# Patient Record
Sex: FEMALE | URBAN - METROPOLITAN AREA
[De-identification: names, ages, dates, MRNs, and addresses within clinical notes are randomized per-mention and may not be internally consistent; named-entity substitution may affect disease eponyms.]

---

## 2024-06-11 ENCOUNTER — HOSPITAL ENCOUNTER (OUTPATIENT)
Dept: RADIOLOGY | Facility: EXTERNAL LOCATION | Age: 54
Discharge: HOME | End: 2024-06-11

## 2024-06-18 ENCOUNTER — HOSPITAL ENCOUNTER (OUTPATIENT)
Dept: CARDIOLOGY | Facility: HOSPITAL | Age: 54
Discharge: HOME | End: 2024-06-18
Payer: COMMERCIAL

## 2024-06-18 ENCOUNTER — OFFICE VISIT (OUTPATIENT)
Dept: CARDIOLOGY | Facility: HOSPITAL | Age: 54
End: 2024-06-18
Payer: COMMERCIAL

## 2024-06-18 VITALS
DIASTOLIC BLOOD PRESSURE: 82 MMHG | SYSTOLIC BLOOD PRESSURE: 128 MMHG | HEIGHT: 70 IN | OXYGEN SATURATION: 98 % | BODY MASS INDEX: 32.93 KG/M2 | WEIGHT: 230 LBS | HEART RATE: 60 BPM

## 2024-06-18 DIAGNOSIS — E11.9 TYPE 2 DIABETES MELLITUS WITHOUT COMPLICATION, WITHOUT LONG-TERM CURRENT USE OF INSULIN (MULTI): ICD-10-CM

## 2024-06-18 DIAGNOSIS — I48.0 PAROXYSMAL ATRIAL FIBRILLATION (MULTI): Primary | ICD-10-CM

## 2024-06-18 DIAGNOSIS — I25.42 SPONTANEOUS DISSECTION OF CORONARY ARTERY: ICD-10-CM

## 2024-06-18 DIAGNOSIS — I21.19 ST ELEVATION MYOCARDIAL INFARCTION (STEMI) INVOLVING OTHER CORONARY ARTERY OF INFERIOR WALL (MULTI): ICD-10-CM

## 2024-06-18 DIAGNOSIS — I72.8 SPLENIC ARTERY ANEURYSM (CMS-HCC): ICD-10-CM

## 2024-06-18 PROBLEM — K57.92 DIVERTICULITIS: Status: ACTIVE | Noted: 2022-11-07

## 2024-06-18 PROBLEM — I10 HYPERTENSIVE DISORDER: Status: ACTIVE | Noted: 2022-11-07

## 2024-06-18 PROBLEM — E53.8 COBALAMIN DEFICIENCY: Status: ACTIVE | Noted: 2022-11-07

## 2024-06-18 PROBLEM — G43.909 MIGRAINE: Status: ACTIVE | Noted: 2022-11-07

## 2024-06-18 PROBLEM — E66.9 OBESITY: Status: ACTIVE | Noted: 2022-11-10

## 2024-06-18 PROBLEM — I48.91 ATRIAL FIBRILLATION (MULTI): Status: ACTIVE | Noted: 2022-11-07

## 2024-06-18 PROBLEM — I10 ESSENTIAL HYPERTENSION: Status: ACTIVE | Noted: 2022-12-29

## 2024-06-18 LAB
ATRIAL RATE: 60 BPM
P AXIS: 15 DEGREES
P OFFSET: 206 MS
P ONSET: 140 MS
PR INTERVAL: 172 MS
Q ONSET: 226 MS
QRS COUNT: 10 BEATS
QRS DURATION: 100 MS
QT INTERVAL: 464 MS
QTC CALCULATION(BAZETT): 464 MS
QTC FREDERICIA: 464 MS
R AXIS: -29 DEGREES
T AXIS: -56 DEGREES
T OFFSET: 458 MS
VENTRICULAR RATE: 60 BPM

## 2024-06-18 PROCEDURE — 93010 ELECTROCARDIOGRAM REPORT: CPT | Performed by: INTERNAL MEDICINE

## 2024-06-18 PROCEDURE — 3079F DIAST BP 80-89 MM HG: CPT | Performed by: INTERNAL MEDICINE

## 2024-06-18 PROCEDURE — 99215 OFFICE O/P EST HI 40 MIN: CPT | Performed by: INTERNAL MEDICINE

## 2024-06-18 PROCEDURE — 99205 OFFICE O/P NEW HI 60 MIN: CPT | Performed by: INTERNAL MEDICINE

## 2024-06-18 PROCEDURE — 1036F TOBACCO NON-USER: CPT | Performed by: INTERNAL MEDICINE

## 2024-06-18 PROCEDURE — 4010F ACE/ARB THERAPY RXD/TAKEN: CPT | Performed by: INTERNAL MEDICINE

## 2024-06-18 PROCEDURE — 93005 ELECTROCARDIOGRAM TRACING: CPT

## 2024-06-18 PROCEDURE — 3074F SYST BP LT 130 MM HG: CPT | Performed by: INTERNAL MEDICINE

## 2024-06-18 RX ORDER — PROPRANOLOL HYDROCHLORIDE 10 MG/1
10 TABLET ORAL
COMMUNITY
Start: 2023-02-28

## 2024-06-18 RX ORDER — ONDANSETRON 8 MG/1
TABLET, ORALLY DISINTEGRATING ORAL
COMMUNITY
Start: 2023-11-21

## 2024-06-18 RX ORDER — AMIODARONE HYDROCHLORIDE 200 MG/1
200 TABLET ORAL DAILY
COMMUNITY
Start: 2024-05-29

## 2024-06-18 RX ORDER — APIXABAN 5 MG/1
5 TABLET, FILM COATED ORAL 2 TIMES DAILY
COMMUNITY
Start: 2022-12-29

## 2024-06-18 RX ORDER — DRONEDARONE 400 MG/1
400 TABLET, FILM COATED ORAL AS NEEDED
COMMUNITY
Start: 2022-12-29

## 2024-06-18 RX ORDER — SEMAGLUTIDE 0.68 MG/ML
INJECTION, SOLUTION SUBCUTANEOUS
COMMUNITY
Start: 2024-06-10

## 2024-06-18 RX ORDER — DIAZEPAM 5 MG/1
5 TABLET ORAL
COMMUNITY
Start: 2024-05-09

## 2024-06-18 RX ORDER — BLOOD-GLUCOSE,RECEIVER,CONT
1 EACH MISCELLANEOUS
COMMUNITY
Start: 2023-11-06

## 2024-06-18 RX ORDER — LOSARTAN POTASSIUM 100 MG/1
100 TABLET ORAL DAILY
COMMUNITY
Start: 2024-06-03

## 2024-06-18 RX ORDER — ASPIRIN 81 MG/1
81 TABLET ORAL ONCE
COMMUNITY
Start: 2024-05-29

## 2024-06-18 RX ORDER — METOPROLOL TARTRATE 50 MG/1
50 TABLET ORAL 2 TIMES DAILY
COMMUNITY
Start: 2024-05-29 | End: 2024-06-18 | Stop reason: ALTCHOICE

## 2024-06-18 NOTE — LETTER
June 30, 2024     Shayna Nik    Patient: Shayna Zaragoza   YOB: 1970   Date of Visit: 6/18/2024       Dear Dr. Shayna Zaragoza:    Thank you for referring Shayna Zaragoza to me for evaluation. Below are my notes for this consultation.  If you have questions, please do not hesitate to call me. I look forward to following your patient along with you.       Sincerely,     Angelica Baumann MD      CC: No Recipients  ______________________________________________________________________________________    June 18, 2024    Vascular Medicine - FMD/Arterial Dissection Program    This 53 y.o. 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  She is accompanied by her .    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 Very busy life.      She has a hx of type II DM, hyperlipidemia, and AFIB x ~ 15 years with prior cryoablation. She has been maintained on Eliquis.  She also has HTN and prior heart murmur (felt to be benign).    On May 4th after returning from a road trip she developed a pain in her chest as well as a headache. Pain was different than prior chest pains she had experienced and radiated to her jaw/arms and was associated with burping. Her  took her to the hospital. In ER, she was HTNsive to 197/98 mm Hg and had inferior STEMI on ECG.  She underwent emergency coronary angiography which demonstrated distal RCA tapering occlusion as well as lesions in the LAD and ? OM (my review) concerning for SCAD. There was some attempt at wiring the RCA, but this was aborted and no PCI was done. She has apical HK on ventriculography that also had somewhat of a Takotsubo appearance.  She was managed medically. Peak CTnI  was 2314. Echo prior to discharge reported LVEF 55-60% with infero/apical RWMA. She was discharged on aspirin + ticagrelor in addition to her Eliquis. She was discharged on May 6th. She did undergo vascular  imaging which identified splenic artery aneurysm.    Since discharge she mostly feels well. She has had some palpitations c/w prior AFIB and some minor chest pressure.    Past Medical History:   Diagnosis Date   • Atrial fibrillation (Multi)    • History of migraine headaches    • Hypertension    • Spontaneous dissection of coronary artery      Patient Active Problem List   Diagnosis   • Atrial fibrillation (Multi)   • Cobalamin deficiency   • Diverticulitis   • Essential hypertension   • Hypertensive disorder   • Diabetes mellitus (Multi)   • Migraine   • Obesity     Past Surgical History:   Procedure Laterality Date   • ABLATION OF DYSRHYTHMIC FOCUS     • CARDIAC CATHETERIZATION       Current Outpatient Medications   Medication Sig Dispense Refill   • amiodarone (Pacerone) 200 mg tablet Take 1 tablet (200 mg) by mouth once daily.     • aspirin 81 mg EC tablet Take 1 tablet (81 mg) by mouth 1 time.     • Dexcom G7  misc Inject 1 Device under the skin.     • diazePAM (Valium) 5 mg tablet Take 1 tablet (5 mg) by mouth.     • Eliquis 5 mg tablet Take 1 tablet (5 mg) by mouth 2 times a day.     • losartan (Cozaar) 100 mg tablet Take 1 tablet (100 mg) by mouth once daily.     • Multaq 400 mg tablet Take 1 tablet (400 mg) by mouth if needed.     • ondansetron ODT (Zofran-ODT) 8 mg disintegrating tablet DISSOLVE 1 TABLET ON THE TONGUE TWICE DAILY FOR 5 DAYS AS NEEDED     • Ozempic 0.25 mg or 0.5 mg (2 mg/3 mL) pen injector INJECT 0.5MG UNDER THE SKIN ONCE A WEEK     • propranolol (Inderal) 10 mg tablet 1 tablet (10 mg).     • metoprolol succinate XL (Toprol-XL) 50 mg 24 hr tablet Take 1 tablet (50 mg) by mouth once daily. Do not crush or chew. 90 tablet 3   • nitroglycerin (Nitrostat) 0.4 mg SL tablet Place 1 tablet (0.4 mg) under the tongue every 5 minutes if needed for chest pain for up to 10 days. 10 tablet 3     No current facility-administered medications for this visit.     ON metoprolol Tartrate at  "visit    Allergies   Allergen Reactions   • Ciprofloxacin Other     No family hx of arterial aneurysm/dissection/FMD  One of her children has benign intracranial HTN, but no known vascular disease    ROS + varicose veins    On examination:  /82 (BP Location: Right arm)   Pulse 60   Ht 1.778 m (5' 10\")   Wt 104 kg (230 lb)   SpO2 98%   BMI 33.00 kg/m²   HEENT No Marley's  Thyroid not grossly enlarged (has nodule)  JVP flat  No cervical bruits  +s1, S2, RRR; no m/r/g  No abdominal/femoral bruits  Good LE pulses  Some minor venous disease    ECG today:  NSR at 60 beats per minute. Inferolateral Q wave MI with residual inferolateral T wave inversions.    Coronary angiogram  RCA tapered occlusion c/w type 2b scad      LAD tappered occlusion      This OM1 branch looks somewhat tapered to me distally      5.5.2024 CTA head/neck  Right ICA/left ICA slight tortuosity  No ICA/vertebral FMD, dissection  No IC aneurysm  Visualized thoracic aorta normal    Report of CTA abdomen  Splenic artery aneurysm 1.2 cm report    Assessment/Plan:  53 y.o. woman with inferior STEMI found to have distal RCA and distal LAD tapered occlusive lesions consistent with spontaneous coronary artery dissection (and OM1 tapering).  There were no clear antecedent emotional or physical stressors, beyond chronic work and family life related stress.  She has recovered. She has prior hx of AFIB with some reoccurrence and has been on Eliquis and amiodarone after cryoablation. She has small splenic aneurysm; no other definitive FMD lesions, but this plus SCAD x 2 may indicate underlying arteriopathy.  We touched on the following.    At this time, agree with single anti PLT therapy with aspirin + anticoagulation to Eliquis given her AFIB.  She is not having post MI chest pain; OK to switch metoprolol tartrate to succinate 50 mg QD with tartrate or propranolol available PRN for AFIB. She is also on amiodarone so need to avoid bradycardia.  Given no " recurrent chest pain, OK to begin phase II cardiac rehab ~ 4 weeks post event  Needs repeat echo within next 1-2 months to revisit LVEF/RWMA and r/o apical aneurysm/thrombus  Goal to have LDL < 100 mg/dL, ideally < 70 mg/dL given concomitant DM even though this was a non atherosclerotic disease MI  Had apical HK on vgram and echo --- needs post MI follow-up echo to be sure no aneurysms remodeling  I need to review her CTA abdomen/pelvis images personally; will work on having them sent/uploaded; plan repeat mesenteric duplex in the late summer/fall to see how this can be followed.  We discussed threshold for repair of splenic artery aneurysms currently > 2.5 and even as high as 3.0 cm; hopefully hers will never grow to that size  We discussed how given no clear FMD, would pursue arteriopathy panel given SCAD x 2 + splenic aneurysm to rule out vEDS/LDS, etc. We could work to coordinate this here at .  Discussed BP control as vital to prevent recurrent everts; goal < 130/80 mm Hg at all times to prevent recurrent SCAD and aneurysmal growth, and her BP not at goal. If Bps above targets would add hydrochlorothiazide 12.5 mg QD. She is also on losartan.    Thyroid nodule follow-up pending.    Ms. Zaragoza consented to enroll in [facility] Registry    RTC later this summer with mesenteric duplex of splenic aneurysm, ECG, medical genetics; she may also establish care closer to home.    >60 minutes spent in today's visit including review of outside records and imaging studies.    Angelica Baumann MD

## 2024-06-18 NOTE — PATIENT INSTRUCTIONS
Nice to meet you today Ms. Zaragoza.    Stop Metoprolol Tartrate and start Metoprolol Succinate 50mg once daily.  Continue other medications as prescribed.    We will reach out after review of below the neck imaging.   Recommend post-SCAD ECHO at home for follow up.    BP should remain less than 130/80 at all times.    See you back on August 26th at 10:30am with mesenteric

## 2024-06-19 RX ORDER — METOPROLOL SUCCINATE 50 MG/1
50 TABLET, EXTENDED RELEASE ORAL DAILY
Qty: 90 TABLET | Refills: 3 | Status: SHIPPED | OUTPATIENT
Start: 2024-06-19 | End: 2025-06-19

## 2024-06-19 NOTE — PROGRESS NOTES
June 18, 2024    Vascular Medicine - FMD/Arterial Dissection Program    This 53 y.o. 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  She is accompanied by her .    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 Very busy life.      She has a hx of type II DM, hyperlipidemia, and AFIB x ~ 15 years with prior cryoablation. She has been maintained on Eliquis.  She also has HTN and prior heart murmur (felt to be benign).    On May 4th after returning from a road trip she developed a pain in her chest as well as a headache. Pain was different than prior chest pains she had experienced and radiated to her jaw/arms and was associated with burping. Her  took her to the hospital. In ER, she was HTNsive to 197/98 mm Hg and had inferior STEMI on ECG.  She underwent emergency coronary angiography which demonstrated distal RCA tapering occlusion as well as lesions in the LAD and ? OM (my review) concerning for SCAD. There was some attempt at wiring the RCA, but this was aborted and no PCI was done. She has apical HK on ventriculography that also had somewhat of a Takotsubo appearance.  She was managed medically. Peak CTnI  was 2314. Echo prior to discharge reported LVEF 55-60% with infero/apical RWMA. She was discharged on aspirin + ticagrelor in addition to her Eliquis. She was discharged on May 6th. She did undergo vascular imaging which identified splenic artery aneurysm.    Since discharge she mostly feels well. She has had some palpitations c/w prior AFIB and some minor chest pressure.    Past Medical History:   Diagnosis Date    Atrial fibrillation (Multi)     History of migraine headaches     Hypertension     Spontaneous dissection of coronary artery      Patient Active Problem List   Diagnosis    Atrial fibrillation (Multi)    Cobalamin deficiency    Diverticulitis    Essential hypertension    Hypertensive disorder     "Diabetes mellitus (Multi)    Migraine    Obesity     Past Surgical History:   Procedure Laterality Date    ABLATION OF DYSRHYTHMIC FOCUS      CARDIAC CATHETERIZATION       Current Outpatient Medications   Medication Sig Dispense Refill    amiodarone (Pacerone) 200 mg tablet Take 1 tablet (200 mg) by mouth once daily.      aspirin 81 mg EC tablet Take 1 tablet (81 mg) by mouth 1 time.      Dexcom G7  misc Inject 1 Device under the skin.      diazePAM (Valium) 5 mg tablet Take 1 tablet (5 mg) by mouth.      Eliquis 5 mg tablet Take 1 tablet (5 mg) by mouth 2 times a day.      losartan (Cozaar) 100 mg tablet Take 1 tablet (100 mg) by mouth once daily.      Multaq 400 mg tablet Take 1 tablet (400 mg) by mouth if needed.      ondansetron ODT (Zofran-ODT) 8 mg disintegrating tablet DISSOLVE 1 TABLET ON THE TONGUE TWICE DAILY FOR 5 DAYS AS NEEDED      Ozempic 0.25 mg or 0.5 mg (2 mg/3 mL) pen injector INJECT 0.5MG UNDER THE SKIN ONCE A WEEK      propranolol (Inderal) 10 mg tablet 1 tablet (10 mg).      metoprolol succinate XL (Toprol-XL) 50 mg 24 hr tablet Take 1 tablet (50 mg) by mouth once daily. Do not crush or chew. 90 tablet 3    nitroglycerin (Nitrostat) 0.4 mg SL tablet Place 1 tablet (0.4 mg) under the tongue every 5 minutes if needed for chest pain for up to 10 days. 10 tablet 3     No current facility-administered medications for this visit.     ON metoprolol Tartrate at visit    Allergies   Allergen Reactions    Ciprofloxacin Other     No family hx of arterial aneurysm/dissection/FMD  One of her children has benign intracranial HTN, but no known vascular disease    ROS + varicose veins    On examination:  /82 (BP Location: Right arm)   Pulse 60   Ht 1.778 m (5' 10\")   Wt 104 kg (230 lb)   SpO2 98%   BMI 33.00 kg/m²   HEENT No Marley's  Thyroid not grossly enlarged (has nodule)  JVP flat  No cervical bruits  +s1, S2, RRR; no m/r/g  No abdominal/femoral bruits  Good LE pulses  Some minor venous " disease    ECG today:  NSR at 60 beats per minute. Inferolateral Q wave MI with residual inferolateral T wave inversions.    Coronary angiogram  RCA tapered occlusion c/w type 2b scad      LAD tappered occlusion      This OM1 branch looks somewhat tapered to me distally      5.5.2024 CTA head/neck  Right ICA/left ICA slight tortuosity  No ICA/vertebral FMD, dissection  No IC aneurysm  Visualized thoracic aorta normal    Report of CTA abdomen  Splenic artery aneurysm 1.2 cm report    Assessment/Plan:  53 y.o. woman with inferior STEMI found to have distal RCA and distal LAD tapered occlusive lesions consistent with spontaneous coronary artery dissection (and OM1 tapering).  There were no clear antecedent emotional or physical stressors, beyond chronic work and family life related stress.  She has recovered. She has prior hx of AFIB with some reoccurrence and has been on Eliquis and amiodarone after cryoablation. She has small splenic aneurysm; no other definitive FMD lesions, but this plus SCAD x 2 may indicate underlying arteriopathy.  We touched on the following.    At this time, agree with single anti PLT therapy with aspirin + anticoagulation to Eliquis given her AFIB.  She is not having post MI chest pain; OK to switch metoprolol tartrate to succinate 50 mg QD with tartrate or propranolol available PRN for AFIB. She is also on amiodarone so need to avoid bradycardia.  Given no recurrent chest pain, OK to begin phase II cardiac rehab ~ 4 weeks post event  Needs repeat echo within next 1-2 months to revisit LVEF/RWMA and r/o apical aneurysm/thrombus  Goal to have LDL < 100 mg/dL, ideally < 70 mg/dL given concomitant DM even though this was a non atherosclerotic disease MI  Had apical HK on vgram and echo --- needs post MI follow-up echo to be sure no aneurysms remodeling  I need to review her CTA abdomen/pelvis images personally; will work on having them sent/uploaded; plan repeat mesenteric duplex in the late  summer/fall to see how this can be followed.  We discussed threshold for repair of splenic artery aneurysms currently > 2.5 and even as high as 3.0 cm; hopefully hers will never grow to that size  We discussed how given no clear FMD, would pursue arteriopathy panel given SCAD x 2 + splenic aneurysm to rule out vEDS/LDS, etc. We could work to coordinate this here at .  Discussed BP control as vital to prevent recurrent everts; goal < 130/80 mm Hg at all times to prevent recurrent SCAD and aneurysmal growth, and her BP not at goal. If Bps above targets would add hydrochlorothiazide 12.5 mg QD. She is also on losartan.    Thyroid nodule follow-up pending.    Ms. Zaragoza consented to enroll in iSC[employer] Registry    RTC later this summer with mesenteric duplex of splenic aneurysm, ECG, medical genetics; she may also establish care closer to home.    >60 minutes spent in today's visit including review of outside records and imaging studies.    Angelica Baumann MD

## 2024-06-21 ENCOUNTER — PATIENT MESSAGE (OUTPATIENT)
Dept: CARDIOLOGY | Facility: HOSPITAL | Age: 54
End: 2024-06-21
Payer: COMMERCIAL

## 2024-06-24 DIAGNOSIS — I25.42 SPONTANEOUS DISSECTION OF CORONARY ARTERY: ICD-10-CM

## 2024-06-24 RX ORDER — NITROGLYCERIN 0.4 MG/1
0.4 TABLET SUBLINGUAL EVERY 5 MIN PRN
Qty: 10 TABLET | Refills: 3 | Status: SHIPPED | OUTPATIENT
Start: 2024-06-24 | End: 2024-07-04

## 2024-06-24 RX ORDER — NITROGLYCERIN 0.4 MG/1
0.4 TABLET SUBLINGUAL EVERY 5 MIN PRN
COMMUNITY
End: 2024-06-24 | Stop reason: SDUPTHER

## 2024-06-30 PROBLEM — I21.3 ST ELEVATION MYOCARDIAL INFARCTION (STEMI) (MULTI): Status: ACTIVE | Noted: 2024-06-30

## 2024-06-30 PROBLEM — I72.8 SPLENIC ARTERY ANEURYSM (CMS-HCC): Status: ACTIVE | Noted: 2024-06-30

## 2024-07-09 DIAGNOSIS — I25.42 SPONTANEOUS DISSECTION OF CORONARY ARTERY: ICD-10-CM

## 2024-07-09 RX ORDER — NITROGLYCERIN 0.4 MG/1
0.4 TABLET SUBLINGUAL EVERY 5 MIN PRN
Qty: 10 TABLET | Refills: 3 | Status: SHIPPED | OUTPATIENT
Start: 2024-07-09 | End: 2024-07-19

## 2024-07-11 ENCOUNTER — HOSPITAL ENCOUNTER (OUTPATIENT)
Dept: RADIOLOGY | Facility: EXTERNAL LOCATION | Age: 54
Discharge: HOME | End: 2024-07-11

## 2024-07-17 DIAGNOSIS — I72.8 SPLENIC ARTERY ANEURYSM (CMS-HCC): ICD-10-CM

## 2024-08-27 ENCOUNTER — APPOINTMENT (OUTPATIENT)
Dept: CARDIOLOGY | Facility: HOSPITAL | Age: 54
End: 2024-08-27
Payer: COMMERCIAL

## 2024-08-27 ENCOUNTER — APPOINTMENT (OUTPATIENT)
Dept: VASCULAR MEDICINE | Facility: HOSPITAL | Age: 54
End: 2024-08-27
Payer: COMMERCIAL

## 2024-09-05 ENCOUNTER — APPOINTMENT (OUTPATIENT)
Dept: CARDIOLOGY | Facility: HOSPITAL | Age: 54
End: 2024-09-05
Payer: COMMERCIAL